# Patient Record
Sex: MALE | Race: WHITE | Employment: FULL TIME | URBAN - METROPOLITAN AREA
[De-identification: names, ages, dates, MRNs, and addresses within clinical notes are randomized per-mention and may not be internally consistent; named-entity substitution may affect disease eponyms.]

---

## 2022-11-03 ENCOUNTER — OFFICE VISIT (OUTPATIENT)
Dept: UROLOGY | Facility: CLINIC | Age: 47
End: 2022-11-03
Payer: COMMERCIAL

## 2022-11-03 DIAGNOSIS — N46.9 MALE STERILITY: Primary | ICD-10-CM

## 2022-11-03 PROCEDURE — 1159F PR MEDICATION LIST DOCUMENTED IN MEDICAL RECORD: ICD-10-PCS | Mod: CPTII,S$GLB,, | Performed by: UROLOGY

## 2022-11-03 PROCEDURE — 99204 PR OFFICE/OUTPT VISIT, NEW, LEVL IV, 45-59 MIN: ICD-10-PCS | Mod: S$GLB,,, | Performed by: UROLOGY

## 2022-11-03 PROCEDURE — 1160F RVW MEDS BY RX/DR IN RCRD: CPT | Mod: CPTII,S$GLB,, | Performed by: UROLOGY

## 2022-11-03 PROCEDURE — 1159F MED LIST DOCD IN RCRD: CPT | Mod: CPTII,S$GLB,, | Performed by: UROLOGY

## 2022-11-03 PROCEDURE — 1160F PR REVIEW ALL MEDS BY PRESCRIBER/CLIN PHARMACIST DOCUMENTED: ICD-10-PCS | Mod: CPTII,S$GLB,, | Performed by: UROLOGY

## 2022-11-03 PROCEDURE — 99204 OFFICE O/P NEW MOD 45 MIN: CPT | Mod: S$GLB,,, | Performed by: UROLOGY

## 2022-11-03 NOTE — PROGRESS NOTES
Subjective:       Patient ID: Daniel Killian is a 47 y.o. male.    Chief Complaint: Sterilization      HPI:  47-year-old male multiple children desires sterility    Past Medical History: History reviewed. No pertinent past medical history.    Past Surgical Historical: History reviewed. No pertinent surgical history.     Medications:      Past Social History:   Social History     Socioeconomic History    Marital status:    Tobacco Use    Smoking status: Never    Smokeless tobacco: Never   Substance and Sexual Activity    Alcohol use: Never    Drug use: Never    Sexual activity: Yes       Allergies: Review of patient's allergies indicates:  No Known Allergies     Family History: History reviewed. No pertinent family history.     Review of Systems:  Review of Systems   Constitutional:  Negative for activity change and appetite change.   HENT:  Negative for congestion and dental problem.    Eyes:  Negative for visual disturbance.   Respiratory:  Negative for chest tightness and shortness of breath.    Cardiovascular:  Negative for chest pain.   Gastrointestinal:  Negative for abdominal distention and abdominal pain.   Endocrine: Negative for polyuria.   Genitourinary:  Negative for difficulty urinating, dysuria, flank pain, frequency, hematuria, penile discharge, penile pain, penile swelling, scrotal swelling, testicular pain and urgency.   Musculoskeletal:  Negative for back pain and neck pain.   Skin:  Negative for color change.   Allergic/Immunologic: Positive for immunocompromised state.   Neurological:  Negative for dizziness.   Hematological:  Negative for adenopathy.   Psychiatric/Behavioral:  Negative for agitation, behavioral problems and confusion.      Physical Exam:  Physical Exam  Constitutional:       General: He is not in acute distress.     Appearance: He is well-developed.   HENT:      Head: Normocephalic and atraumatic.      Nose: Nose normal.   Eyes:      General: No scleral icterus.      Conjunctiva/sclera: Conjunctivae normal.      Pupils: Pupils are equal, round, and reactive to light.   Neck:      Thyroid: No thyromegaly.      Trachea: No tracheal deviation.   Cardiovascular:      Rate and Rhythm: Normal rate and regular rhythm.      Heart sounds: Normal heart sounds.   Pulmonary:      Effort: Pulmonary effort is normal. No respiratory distress.      Breath sounds: Normal breath sounds. No wheezing or rales.   Abdominal:      General: Bowel sounds are normal. There is no distension.      Palpations: Abdomen is soft.      Tenderness: There is no abdominal tenderness. There is no guarding or rebound.   Genitourinary:     Penis: Normal. No tenderness.       Prostate: Normal.   Musculoskeletal:         General: No deformity. Normal range of motion.      Cervical back: Neck supple.   Lymphadenopathy:      Cervical: No cervical adenopathy.   Skin:     General: Skin is warm and dry.      Findings: No erythema or rash.   Neurological:      Mental Status: He is alert and oriented to person, place, and time.      Cranial Nerves: No cranial nerve deficit.   Psychiatric:         Behavior: Behavior normal.       Assessment/Plan:       Problem List Items Addressed This Visit    None  Visit Diagnoses       Male sterility    -  Primary    Relevant Orders    Vasectomy               We had a long discussion regarding vasectomy including the risks and benefits.  The patient understands the permanent nature of the procedure he also understands the potential risks of vasectomy including but not limited to injury to the testicle loss of testicle bleeding hematoma infection and testicular atrophy.  Patient understands these risks is willing to proceed we will schedule vasectomy next available date.

## 2022-12-30 ENCOUNTER — TELEPHONE (OUTPATIENT)
Dept: UROLOGY | Facility: CLINIC | Age: 47
End: 2022-12-30
Payer: COMMERCIAL

## 2022-12-30 NOTE — TELEPHONE ENCOUNTER
Per Marguerite Wheatley NP, pt will be by the office on Tuesday, 01/03/23 to  a work excuse for 7 days to cover him for his time off after his vasectomy.

## 2023-01-06 DIAGNOSIS — N46.9 MALE STERILITY: Primary | ICD-10-CM

## 2023-01-09 ENCOUNTER — TELEPHONE (OUTPATIENT)
Dept: UROLOGY | Facility: CLINIC | Age: 48
End: 2023-01-09

## 2023-01-09 RX ORDER — DIAZEPAM 10 MG/1
TABLET ORAL
Qty: 1 TABLET | Refills: 0 | Status: SHIPPED | OUTPATIENT
Start: 2023-01-09

## 2023-01-09 NOTE — TELEPHONE ENCOUNTER
Patient cannot make it to appointment today to sign Vas consents, needs to reschedule.    MAT Roberson RN                ----- Message from Claudia Guerrero sent at 1/9/2023 11:43 AM CST -----  Contact: [place] Killian  Chantel Dickerson call about Daniel Killian , she wants to reschedule his appt for today with the nurse please call [phone]

## 2023-01-11 ENCOUNTER — CLINICAL SUPPORT (OUTPATIENT)
Dept: UROLOGY | Facility: CLINIC | Age: 48
End: 2023-01-11
Payer: COMMERCIAL

## 2023-01-11 NOTE — PROGRESS NOTES
Pt here for Vasectomy education.  Pre/post op information reviewed and discussed.  Questions answered.  Valium script given. Consents signed.  Pt left [facility] paperwork to be filled out prior to procedure.  Have completed and will have Dr. Sosa sign the will fax to number provided.

## 2023-01-13 DIAGNOSIS — N46.9 MALE STERILITY: Primary | ICD-10-CM

## 2023-01-13 RX ORDER — HYDROCODONE BITARTRATE AND ACETAMINOPHEN 10; 325 MG/1; MG/1
1 TABLET ORAL EVERY 6 HOURS PRN
Qty: 10 TABLET | Refills: 0 | Status: SHIPPED | OUTPATIENT
Start: 2023-01-13

## 2023-01-17 ENCOUNTER — PROCEDURE VISIT (OUTPATIENT)
Dept: UROLOGY | Facility: CLINIC | Age: 48
End: 2023-01-17
Payer: COMMERCIAL

## 2023-01-17 VITALS — HEIGHT: 72 IN | WEIGHT: 194.81 LBS | BODY MASS INDEX: 26.38 KG/M2

## 2023-01-17 DIAGNOSIS — N46.9 MALE STERILITY: Primary | ICD-10-CM

## 2023-01-17 PROCEDURE — 55250 REMOVAL OF SPERM DUCT(S): CPT | Mod: S$GLB,,, | Performed by: UROLOGY

## 2023-01-17 PROCEDURE — 55250 VASECTOMY: ICD-10-PCS | Mod: S$GLB,,, | Performed by: UROLOGY

## 2023-01-17 NOTE — PROCEDURES
"Vasectomy    Date/Time: 1/17/2023 8:30 AM  Performed by: Huy Sosa MD  Authorized by: Huy Sosa MD     Consent Done?:  Yes (Written)  Time out: Immediately prior to procedure a "time out" was called to verify the correct patient, procedure, equipment, support staff and site/side marked as required.    Indications:  [place] male  Patient sedated: No    Preparation: Patient was prepped and draped in usual sterile fashion    Incisions:  1  Length vas excised:  2.5 cm  Vas:  Fulgurated and Buried  Sutures:  3-0 chromic SH  Same procedure performed on both sides    Patient tolerance:  Patient tolerated the procedure well with no immediate complications     Patient was brought to the procedure room placed on table in supine position he was then prepped and draped in the usual sterile fashion. A 2 cm incision was made upper portion of the midline of the scrotum secured into the dartos fascia of the right vas deferens was isolated and skeletonized using the Vas clamps, and at this point a 2 cm segment of the vas deferens was excised, both ends of the vas were fulgurated the proximal end was dunked into the surrounding dartos and pursestring closed the that is on the right side was returned to the scrotum the identical procedure was done on the contralateral side the incision was closed with a 3 O chromic suture patient tolerated the procedure with oral complications.  "

## 2023-01-17 NOTE — PATIENT INSTRUCTIONS
"Patient Education       Vasectomy   The Basics   Written by the doctors and editors at [facility]   What is a vasectomy? -- A vasectomy is a procedure that can be done as a type of long-term birth control. After a successful vasectomy, you will not be able to get a partner pregnant.  How does a vasectomy prevent pregnancy? -- A vasectomy prevents pregnancy by blocking the path the sperm takes to leave the body (figure 1).  Sperm are made in the testicles. The testicles are found inside a skin sac called the "scrotum." Sperm are stored in the epididymis, which is a small organ that sits on top of the testicles. During ejaculation, sperm travel from the epididymis through tubes and out the end of the penis.  During a vasectomy, a doctor cuts and blocks a tube called the "vas deferens" on each side. This prevents sperm from leaving the body. After a vasectomy, you can still ejaculate fluid, called semen. But the semen does not have any sperm in it.  Why might I choose to have a vasectomy? -- You might choose to have a vasectomy if you do not want any more biological children, and do not want to use birth control each time you have sex.  Let your doctor know if you have any questions or worries about having a vasectomy. They can talk with you and tell you about the procedure.  Some people choose to have a sample of their sperm saved before they have a vasectomy. If you want to have a sample of your sperm saved, talk with your doctor.  What happens during a vasectomy? -- A vasectomy is done in a doctor's office and takes about 30 minutes. During the procedure, a doctor numbs the skin on the scrotum. Then they make a small cut in the skin to reach the vas deferens, cut it, and seal it off. The procedure does not hurt, but some people can feel cramping or pulling.  What happens after a vasectomy? -- You can go home right after the procedure, but you will need to rest for 2 to 3 days. After a vasectomy, you will likely have " "some discomfort and bruising in your scrotum. Your doctor will tell you which pain-relieving medicines to take. They might also prescribe a medicine to treat your pain.  Your doctor will give you instructions about what you should and should not do after your vasectomy. They will probably tell you to:  Wear a jock strap to hold the bandage in place  Not bathe or swim for 1 to 2 days  Not lift heavy objects or exercise too hard for 7 days  Wait 7 days to have sex. After that, you must use another form of birth control for a few months to prevent pregnancy.  What are the side effects of a vasectomy? -- Side effects are uncommon, but can occur. They can include:  Severe pain in the scrotum  Bleeding in the scrotum  Infection of the skin around the cut  If you have any side effects, let your doctor know. Some side effects go away over time, but others might need treatment.  How long does it take for a vasectomy to work? -- It takes a few months for a vasectomy to work. That's because the tubes can still have sperm in them.  You will need to ejaculate 20 or more times after a vasectomy to clear out all the sperm from the tubes. Because of this, it's important to use another type of birth control for a few months to prevent getting a partner pregnant.  How will I know my vasectomy worked? -- Yes. You will have a follow-up test called a "sperm count" to make sure your semen does not have any sperm in it. This is usually done 3 months after their vasectomy.  A sperm count checks how many sperm are in a sample of semen. For this test, you will need to provide a sample of your semen.  If your sample has no sperm, you can stop using other birth control because you will not be able to get a partner pregnant. But if your sample does have sperm in it, you could get a partner pregnant. You should still use birth control until you have another sperm count done.  What if I change my mind after having a vasectomy? -- If you have had " "a vasectomy and decide that you do want to be able to get your partner pregnant, talk with your doctor. A surgery to reconnect the vas deferens and open the sperm's path can be done. But this surgery does not always work.  Does a vasectomy prevent me getting a disease from sex? -- No. A vasectomy does not prevent you from getting or spreading a disease from sex. To prevent getting or spreading a disease from sex, you should use a type of protection called a condom.  All topics are updated as new evidence becomes available and our peer review process is complete.  This topic retrieved from [employer] on: Sep 21, 2021.  Topic 73549 Version 8.0  Release: 29.4.2 - C29.263  © 2021 UpToDate, Inc. and/or its affiliates. All rights reserved.  figure 1: Vasectomy     A vasectomy is a procedure that can be done as a type of long-term birth control. After a successful vasectomy, you will not be able to get a partner pregnant.Sperm are made in the testicles and stored in the epididymis. During a vasectomy, a doctor cuts and blocks off the two tubes that carry sperm out of the epididymis. These tubes - one on the left and one on the right - are called the "vas deferens." After the surgery, sperm get reabsorbed into the body. The sperm do not come out during ejaculation.  Graphic 79747 Version 13.0    Consumer Information Use and Disclaimer   This information is not specific medical advice and does not replace information you receive from your health care provider. This is only a brief summary of general information. It does NOT include all information about conditions, illnesses, injuries, tests, procedures, treatments, therapies, discharge instructions or life-style choices that may apply to you. You must talk with your health care provider for complete information about your health and treatment options. This information should not be used to decide whether or not to accept your health care provider's advice, instructions or " recommendations. Only your health care provider has the knowledge and training to provide advice that is right for you. The use of this information is governed by the [employer] End User License Agreement, available at https://www.[employer]/en/solutions/[employer]/about/grace.The use of [employer] content is governed by the [employer] Terms of Use. ©2021 UpToDate, Inc. All rights reserved.  Copyright   © 2021 UpToDate, Inc. and/or its affiliates. All rights reserved.

## 2023-04-17 LAB
ABSTINENCE: 4 DAYS (ref 3–7)
Lab: ABNORMAL
MICROSCOPIC EXAM: ABNORMAL
MOTILITY: ABNORMAL
RECEIVED WITHIN 1 HOUR: YES
SPERM SEEN: ABNORMAL SPERM/HPF
TIME DELIVERED: ABNORMAL
VOLUME: 1 ML

## 2023-05-08 ENCOUNTER — TELEPHONE (OUTPATIENT)
Dept: UROLOGY | Facility: CLINIC | Age: 48
End: 2023-05-08
Payer: COMMERCIAL

## 2023-05-08 NOTE — TELEPHONE ENCOUNTER
No answer. Left message informing patient that he is now sterile & to call with any questions or concerns.                 ----- Message from Marguerite Wheatley NP sent at 5/8/2023  1:43 PM CDT -----  Please notify the patient that he is sterile.